# Patient Record
Sex: FEMALE | Race: WHITE | NOT HISPANIC OR LATINO | URBAN - METROPOLITAN AREA
[De-identification: names, ages, dates, MRNs, and addresses within clinical notes are randomized per-mention and may not be internally consistent; named-entity substitution may affect disease eponyms.]

---

## 2020-07-29 ENCOUNTER — OFFICE VISIT (OUTPATIENT)
Dept: URBAN - METROPOLITAN AREA CLINIC 96 | Facility: CLINIC | Age: 45
End: 2020-07-29

## 2020-08-13 ENCOUNTER — OFFICE VISIT (OUTPATIENT)
Dept: URBAN - METROPOLITAN AREA CLINIC 92 | Facility: CLINIC | Age: 45
End: 2020-08-13

## 2020-08-17 ENCOUNTER — DASHBOARD ENCOUNTERS (OUTPATIENT)
Age: 45
End: 2020-08-17

## 2020-08-17 ENCOUNTER — LAB OUTSIDE AN ENCOUNTER (OUTPATIENT)
Dept: URBAN - METROPOLITAN AREA CLINIC 96 | Facility: CLINIC | Age: 45
End: 2020-08-17

## 2020-08-17 ENCOUNTER — OFFICE VISIT (OUTPATIENT)
Dept: URBAN - METROPOLITAN AREA CLINIC 96 | Facility: CLINIC | Age: 45
End: 2020-08-17
Payer: OTHER GOVERNMENT

## 2020-08-17 DIAGNOSIS — R10.11 RIGHT UPPER QUADRANT ABDOMINAL PAIN: ICD-10-CM

## 2020-08-17 DIAGNOSIS — R13.10 ESOPHAGEAL DYSPHAGIA: ICD-10-CM

## 2020-08-17 DIAGNOSIS — R12 HEARTBURN: ICD-10-CM

## 2020-08-17 DIAGNOSIS — K83.8 BILIARY SLUDGE DETERMINED BY ULTRASOUND: ICD-10-CM

## 2020-08-17 DIAGNOSIS — R10.30 LOWER ABDOMINAL PAIN: ICD-10-CM

## 2020-08-17 DIAGNOSIS — Z12.11 COLON CANCER SCREENING: ICD-10-CM

## 2020-08-17 PROBLEM — 27123005: Status: ACTIVE | Noted: 2020-08-17

## 2020-08-17 PROBLEM — 301717006: Status: ACTIVE | Noted: 2020-08-17

## 2020-08-17 PROBLEM — 40890009: Status: ACTIVE | Noted: 2020-08-17

## 2020-08-17 PROBLEM — 16331000: Status: ACTIVE | Noted: 2020-08-17

## 2020-08-17 PROCEDURE — 99204 OFFICE O/P NEW MOD 45 MIN: CPT | Performed by: PHYSICIAN ASSISTANT

## 2020-08-17 RX ORDER — POLYETHYLENE GLYCOL 3350, SODIUM SULFATE, SODIUM CHLORIDE, POTASSIUM CHLORIDE, ASCORBIC ACID, SODIUM ASCORBATE 140-9-5.2G
AS DIRECTED KIT ORAL 1
Qty: 1 | Refills: 0 | OUTPATIENT
Start: 2020-08-17 | End: 2020-08-18

## 2020-08-17 RX ORDER — GUARN/MA-HUANG/P.GIN/S.GINSENG
1 TABLET WITH MEALS TABLET ORAL TWICE A DAY
Status: ACTIVE | COMMUNITY

## 2020-08-17 RX ORDER — ESOMEPRAZOLE MAGNESIUM 40 MG/1
1 CAPSULE CAPSULE, DELAYED RELEASE ORAL ONCE A DAY
Qty: 30 CAPSULE | Refills: 2 | OUTPATIENT
Start: 2020-08-17

## 2020-08-17 NOTE — HPI-TODAY'S VISIT:
Patient has reflux that started about 2 months ago - no change in diet just started - she started to take OTC Pepcid AC two times a day over the past week and it has helped a lot She had to sleep sitting up - she feels acid in her chest and she has dysphagia a lot Has tried chewing better and decreased how fast she is eating but eats pretty fast the reflux has gotten so bad the past few weeks she has had nausea and regurgitation - no emesis Gaviscon and Maalox helped at firs than stopped so she switched to Pepcid She also did a 14 day OTC Nexium and it helped too Had a different PPI 2 years ago but it caused increased nausea She had right inguinal hernia repair done 2 years ago Ultrasound on abdomen last year showed small amoung of biliary sludge - she had this done for the same pain she is having now The pain is most days - right lower quadrant - usually worse before and during BM but better after She has a long hx of constipation - takes 2 stool softners daily and MIralax one day a week Her normal bowel habit is 1 BM qd No BRBPR or melena She has never had a colonoscopy Father had colon polyps - no family hx colon cancer No unintentional weight loss Mom and sister have both had their gallbladder removed She is having a hysterectomy and tummy tuck on 8/28/2020

## 2020-08-17 NOTE — PHYSICAL EXAM GASTROINTESTINAL
Abdomen,  soft, tender in ruq and rlq, nondistended,  no guarding or rigidity,  no masses palpable,  normal bowel sounds,  Liver and Spleen,  no hepatomegaly present,  no hepatosplenomegaly,  liver nontender Rectal, deferred

## 2020-08-19 ENCOUNTER — OFFICE VISIT (OUTPATIENT)
Dept: URBAN - METROPOLITAN AREA CLINIC 96 | Facility: CLINIC | Age: 45
End: 2020-08-19

## 2020-08-20 ENCOUNTER — OFFICE VISIT (OUTPATIENT)
Dept: URBAN - METROPOLITAN AREA SURGERY CENTER 18 | Facility: SURGERY CENTER | Age: 45
End: 2020-08-20
Payer: OTHER GOVERNMENT

## 2020-08-20 DIAGNOSIS — R12 BURNING REFLUX: ICD-10-CM

## 2020-08-20 DIAGNOSIS — K31.89 ACQUIRED DEFORMITY OF DUODENUM: ICD-10-CM

## 2020-08-20 DIAGNOSIS — D12.5 ADENOMA OF SIGMOID COLON: ICD-10-CM

## 2020-08-20 DIAGNOSIS — Z12.11 ENCOUNTER FOR COLONOSCOPY DUE TO HISTORY OF ADENOMATOUS COLONIC POLYPS: ICD-10-CM

## 2020-08-20 DIAGNOSIS — R10.84 ABDOMINAL CRAMPING, GENERALIZED: ICD-10-CM

## 2020-08-20 PROCEDURE — G8907 PT DOC NO EVENTS ON DISCHARG: HCPCS | Performed by: INTERNAL MEDICINE

## 2020-08-20 PROCEDURE — 43239 EGD BIOPSY SINGLE/MULTIPLE: CPT | Performed by: INTERNAL MEDICINE

## 2020-08-20 PROCEDURE — 45385 COLONOSCOPY W/LESION REMOVAL: CPT | Performed by: INTERNAL MEDICINE

## 2020-08-20 PROCEDURE — G9933 CANC DETECTD DURING COL SCRN: HCPCS | Performed by: INTERNAL MEDICINE

## 2020-08-20 RX ORDER — ESOMEPRAZOLE MAGNESIUM 40 MG/1
1 CAPSULE CAPSULE, DELAYED RELEASE ORAL ONCE A DAY
Qty: 30 CAPSULE | Refills: 2 | Status: ACTIVE | COMMUNITY
Start: 2020-08-17

## 2020-08-20 RX ORDER — GUARN/MA-HUANG/P.GIN/S.GINSENG
1 TABLET WITH MEALS TABLET ORAL TWICE A DAY
Status: ACTIVE | COMMUNITY

## 2020-08-24 ENCOUNTER — TELEPHONE ENCOUNTER (OUTPATIENT)
Dept: URBAN - METROPOLITAN AREA CLINIC 96 | Facility: CLINIC | Age: 45
End: 2020-08-24

## 2020-08-26 ENCOUNTER — TELEPHONE ENCOUNTER (OUTPATIENT)
Dept: URBAN - METROPOLITAN AREA CLINIC 96 | Facility: CLINIC | Age: 45
End: 2020-08-26

## 2020-09-11 ENCOUNTER — TELEPHONE ENCOUNTER (OUTPATIENT)
Dept: URBAN - METROPOLITAN AREA CLINIC 96 | Facility: CLINIC | Age: 45
End: 2020-09-11

## 2020-09-14 ENCOUNTER — TELEPHONE ENCOUNTER (OUTPATIENT)
Dept: URBAN - METROPOLITAN AREA CLINIC 92 | Facility: CLINIC | Age: 45
End: 2020-09-14

## 2020-09-14 RX ORDER — ESOMEPRAZOLE MAGNESIUM 40 MG/1
1 CAPSULE CAPSULE, DELAYED RELEASE ORAL ONCE A DAY
Qty: 30 CAPSULE | Refills: 2 | Status: ACTIVE | COMMUNITY
Start: 2020-08-17

## 2020-09-14 RX ORDER — DICYCLOMINE HYDROCHLORIDE 20 MG/1
1 TABLET TABLET ORAL QAM
Qty: 30 | Refills: 1 | OUTPATIENT
Start: 2020-09-15 | End: 2020-11-13

## 2020-09-14 RX ORDER — GUARN/MA-HUANG/P.GIN/S.GINSENG
1 TABLET WITH MEALS TABLET ORAL TWICE A DAY
Status: ACTIVE | COMMUNITY

## 2022-05-26 NOTE — PHYSICAL EXAM PSYCH:
Returned call to [facility], she notes discoloration of her right foot but denies any open wounds, sores, changes in temperature, or edema. She states the discoloration seems to wax and wane but seemed \"darker\" today. She is scheduled to see Dr. Leyla Blanchard tomorrow for plantar fasciitis and instructed to return call to our office with an update and her recommendations. Educated on CLI and s/s to watch for. She v/u and will return call tomorrow. normal mood with appropriate affect